# Patient Record
Sex: FEMALE | Race: WHITE | NOT HISPANIC OR LATINO | Employment: PART TIME | URBAN - METROPOLITAN AREA
[De-identification: names, ages, dates, MRNs, and addresses within clinical notes are randomized per-mention and may not be internally consistent; named-entity substitution may affect disease eponyms.]

---

## 2018-07-19 ENCOUNTER — TRANSCRIBE ORDERS (OUTPATIENT)
Dept: ADMINISTRATIVE | Facility: HOSPITAL | Age: 23
End: 2018-07-19

## 2018-07-19 DIAGNOSIS — G62.9 NEUROPATHY: Primary | ICD-10-CM

## 2018-07-24 ENCOUNTER — HOSPITAL ENCOUNTER (OUTPATIENT)
Dept: INFUSION THERAPY | Facility: HOSPITAL | Age: 23
Discharge: HOME | End: 2018-07-24
Attending: INTERNAL MEDICINE | Admitting: PSYCHIATRY & NEUROLOGY

## 2018-07-24 DIAGNOSIS — G62.9 NEUROPATHY: ICD-10-CM

## 2018-07-24 PROCEDURE — 95886 MUSC TEST DONE W/N TEST COMP: CPT | Performed by: PSYCHIATRY & NEUROLOGY

## 2018-07-24 PROCEDURE — 95912 NRV CNDJ TEST 11-12 STUDIES: CPT | Performed by: PSYCHIATRY & NEUROLOGY

## 2018-07-24 PROCEDURE — 95912 NRV CNDJ TEST 11-12 STUDIES: CPT

## 2018-07-24 PROCEDURE — 95886 MUSC TEST DONE W/N TEST COMP: CPT

## 2018-07-24 NOTE — PROCEDURES
EMG REPORT    Indication: Numbness of all 4 extremities with neck pain    Findings: Bilateral median and ulnar sensory study showed normal latencies and amplitudes.  Bilateral median motor study showed normal distal latencies and amplitudes.  Right median velocity was normal.  Bilateral ulnar motor study showed normal distal latencies and amplitudes.  Right peroneal motor study showed normal distal latency velocity and amplitude.  Bilateral tibial motor study showed normal distal latencies and amplitudes.    Concentric needle EMG of the right deltoid, triceps, brachioradialis, extensor digitorum, and first dorsal interosseous muscles showed no abnormality.Concentric needle EMG of the right vastus lateralis, vastus medialis, anterior tibialis, peroneus, gastrocnemius muscles showed no abnormality.    Impression: Normal nerve conduction studies of all 4 extremities.  Normal needle EMG of the right upper and lower extremities.    Thank you for sending this patient for further evaluation.  Grant Delgadillo M.D.

## 2018-08-02 ENCOUNTER — OFFICE VISIT (OUTPATIENT)
Dept: NEUROLOGY | Facility: CLINIC | Age: 23
End: 2018-08-02

## 2018-08-02 VITALS — BODY MASS INDEX: 29.23 KG/M2 | HEIGHT: 63 IN | WEIGHT: 165 LBS

## 2018-08-02 DIAGNOSIS — R68.89 SPELLS OF DECREASED ATTENTIVENESS: Primary | ICD-10-CM

## 2018-08-02 DIAGNOSIS — R20.2 NUMBNESS AND TINGLING OF BOTH FEET: ICD-10-CM

## 2018-08-02 DIAGNOSIS — R20.0 NUMBNESS AND TINGLING OF BOTH FEET: ICD-10-CM

## 2018-08-02 DIAGNOSIS — R20.2 BILATERAL NUMBNESS AND TINGLING OF ARMS AND LEGS: ICD-10-CM

## 2018-08-02 DIAGNOSIS — R20.0 BILATERAL NUMBNESS AND TINGLING OF ARMS AND LEGS: ICD-10-CM

## 2018-08-02 DIAGNOSIS — G44.209 TENSION HEADACHE: ICD-10-CM

## 2018-08-02 PROCEDURE — 99214 OFFICE O/P EST MOD 30 MIN: CPT | Performed by: PSYCHIATRY & NEUROLOGY

## 2018-08-02 RX ORDER — FLUTICASONE PROPIONATE 50 MCG
SPRAY, SUSPENSION (ML) NASAL
COMMUNITY

## 2018-08-02 RX ORDER — DULOXETIN HYDROCHLORIDE 60 MG/1
CAPSULE, DELAYED RELEASE ORAL
COMMUNITY
End: 2019-01-03

## 2018-08-02 RX ORDER — GABAPENTIN 300 MG/1
CAPSULE ORAL
COMMUNITY
End: 2019-01-03

## 2018-08-02 NOTE — PROGRESS NOTES
Subjective:     Patient ID: Enedina Brandt is a 23 y.o. female.    History of Present Illness     Patient is a 23-year-old right-handed young woman who was seen for further evaluation of several neurological issues including headaches, episodes of diminished consciousness, numbness of arms and legs. The patient was seen today in consultation per the request of  Dr. Shankar.  History was also taken from both parents.  Patient has been having problems with progressive numbness and tingling of arms and legs since February.  She was placed on Trokendi in January.  This was stopped a week ago.  She has a history of fibromyalgia and is on gabapentin for this she also takes Cymbalta for anxiety.  She was just taken off of Zoloft.  The patient had EMG and nerve conduction studies of all 4 extremities performed by me that was totally normal she's also had no normal MRI of the brain.  She's had 2 episodes of loss of consciousness.  The first occurred at college she fell and hit her head after her legs were shaky she had another episode of collapse of since that time.  She goes to college in [place].  He also suffers from insomnia and tension headaches.  Headaches are constant and not severe.  She's had extensive blood workup done and those results are not available.  Also having a problem with brain fogginess and trouble concentrating.  These problems seem to have an insidious onset.      The following portions of the patient's history were reviewed and updated as appropriate: allergies, current medications, past family history, past medical history, past social history, past surgical history and problem list.  Family History   Problem Relation Age of Onset   • No Known Problems Mother    • No Known Problems Father      Active Ambulatory Problems     Diagnosis Date Noted   • Spells of decreased attentiveness 08/02/2018   • Tension headache 08/02/2018   • Numbness and tingling of both feet 08/02/2018   • Bilateral numbness  and tingling of arms and legs 08/02/2018     Resolved Ambulatory Problems     Diagnosis Date Noted   • No Resolved Ambulatory Problems     Past Medical History:   Diagnosis Date   • Fibromyalgia, primary    • POTS (postural orthostatic tachycardia syndrome)      Social History     Social History   • Marital status: Single     Spouse name: N/A   • Number of children: N/A   • Years of education: N/A     Occupational History   • Not on file.     Social History Main Topics   • Smoking status: Never Smoker   • Smokeless tobacco: Never Used   • Alcohol use No   • Drug use: No   • Sexual activity: Defer     Other Topics Concern   • Not on file     Social History Narrative   • No narrative on file     No current outpatient prescriptions on file prior to visit.     No current facility-administered medications on file prior to visit.        Review of Systems   Constitutional: Positive for appetite change (LOWER), chills and fatigue. Negative for activity change, diaphoresis, fever and unexpected weight change.   HENT: Positive for sore throat and trouble swallowing (TIGHT). Negative for congestion, dental problem, drooling, ear discharge, ear pain, facial swelling, hearing loss, mouth sores, nosebleeds, postnasal drip, rhinorrhea, sinus pain, sinus pressure, sneezing, tinnitus and voice change.    Eyes: Positive for itching and visual disturbance (BLURRY). Negative for photophobia, pain, discharge and redness.   Respiratory: Positive for shortness of breath (SLEEPING) and wheezing. Negative for apnea, cough, choking, chest tightness and stridor.    Cardiovascular: Positive for chest pain and leg swelling (BOTH KNEES). Negative for palpitations.   Gastrointestinal: Positive for nausea. Negative for abdominal distention, abdominal pain, anal bleeding, blood in stool, constipation, diarrhea, rectal pain and vomiting.   Endocrine: Positive for cold intolerance. Negative for heat intolerance, polydipsia, polyphagia and polyuria.    Genitourinary: Negative for decreased urine volume, difficulty urinating, dyspareunia, dysuria, enuresis, flank pain, frequency, genital sores, hematuria, menstrual problem, pelvic pain, urgency, vaginal bleeding, vaginal discharge and vaginal pain.   Musculoskeletal: Positive for gait problem (FALLS). Negative for arthralgias, back pain, joint swelling, myalgias, neck pain and neck stiffness.   Skin: Negative for color change, pallor, rash and wound.   Allergic/Immunologic: Negative for environmental allergies, food allergies and immunocompromised state.   Neurological: Positive for dizziness, tremors, weakness, light-headedness, numbness (LEGS AND ARMS) and headaches. Negative for seizures, syncope, facial asymmetry and speech difficulty.   Hematological: Negative for adenopathy. Does not bruise/bleed easily.   Psychiatric/Behavioral: Positive for agitation, confusion, decreased concentration, sleep disturbance and suicidal ideas (FEW DAYS AGO, ). Negative for behavioral problems, dysphoric mood, hallucinations and self-injury. The patient is nervous/anxious. The patient is not hyperactive.         Objective:    Neurologic Exam  Mental status was appropriate for age.  Fundoscopy showed no papilledema. Visual fields were full to OKN.  Eye movements were full and conjugate.  Pupillary reflexes were mid-range and symmetric.  No facial weakness was noted.  Tongue was midline.  There was no pattern of focal weakness.  Gait was appropriate for age.  No pathologic reflexes were noted.  No cerebellar signs were noted.  Tone was normal.  Deep tendon reflexes were 2+ and symmetric.  Senses sensory examination was normal to pinprick, vibration, and temperature.  Physical Exam    Assessment/Plan:     Enedina was seen today for brain fog.    Diagnoses and all orders for this visit:    Spells of decreased attentiveness  -     EEG Awake or Asleep Routine; Future  -     Ambulatory Referral to Neuropsychology    Bilateral  numbness and tingling of arms and legs    Numbness and tingling of both feet    Tension headache         Do not have a cause for her symptoms.  Fibromyalgia clouds the diagnosis even more.  MRI of the brain, EMG and nerve conduction studies and neurological examination are all normal.  I have set up a neuropsychological evaluation.  I also set up EEG because of the episodes of loss of consciousness.  It will be problematic to get her testing done as she goes to school in [place].  Have not set up specific follow-up in the office but of asked her to call me after the above testing is available.  If the neuropsychological testing cannot be done here we could try to get it done in [place]. Thank you for allowing me to share in the care of this patient.  Grant Delgadillo M.D.

## 2018-08-08 ENCOUNTER — HOSPITAL ENCOUNTER (OUTPATIENT)
Dept: NEUROLOGY | Facility: HOSPITAL | Age: 23
Discharge: HOME | End: 2018-08-08
Attending: PSYCHIATRY & NEUROLOGY | Admitting: PSYCHIATRY & NEUROLOGY

## 2018-08-08 DIAGNOSIS — R68.89 SPELLS OF DECREASED ATTENTIVENESS: ICD-10-CM

## 2018-08-08 PROCEDURE — 95819 EEG AWAKE AND ASLEEP: CPT

## 2018-08-08 PROCEDURE — 95813 EEG EXTND MNTR 61-119 MIN: CPT | Performed by: PSYCHIATRY & NEUROLOGY

## 2019-01-03 ENCOUNTER — OFFICE VISIT (OUTPATIENT)
Dept: NEUROLOGY | Facility: CLINIC | Age: 24
End: 2019-01-03

## 2019-01-03 VITALS
SYSTOLIC BLOOD PRESSURE: 105 MMHG | OXYGEN SATURATION: 91 % | HEIGHT: 63 IN | HEART RATE: 73 BPM | WEIGHT: 181 LBS | DIASTOLIC BLOOD PRESSURE: 80 MMHG | BODY MASS INDEX: 32.07 KG/M2

## 2019-01-03 DIAGNOSIS — R20.0 NUMBNESS AND TINGLING OF BOTH FEET: ICD-10-CM

## 2019-01-03 DIAGNOSIS — R68.89 SPELLS OF DECREASED ATTENTIVENESS: ICD-10-CM

## 2019-01-03 DIAGNOSIS — G44.209 TENSION HEADACHE: Primary | ICD-10-CM

## 2019-01-03 DIAGNOSIS — R20.0 BILATERAL NUMBNESS AND TINGLING OF ARMS AND LEGS: ICD-10-CM

## 2019-01-03 DIAGNOSIS — R20.2 NUMBNESS AND TINGLING OF BOTH FEET: ICD-10-CM

## 2019-01-03 DIAGNOSIS — R20.2 BILATERAL NUMBNESS AND TINGLING OF ARMS AND LEGS: ICD-10-CM

## 2019-01-03 PROCEDURE — 99213 OFFICE O/P EST LOW 20 MIN: CPT | Performed by: PSYCHIATRY & NEUROLOGY

## 2019-01-03 RX ORDER — TRAZODONE HYDROCHLORIDE 50 MG/1
50 TABLET ORAL NIGHTLY
COMMUNITY

## 2019-01-03 RX ORDER — CYCLOBENZAPRINE HCL 10 MG
10 TABLET ORAL 3 TIMES DAILY PRN
COMMUNITY

## 2019-01-03 RX ORDER — VENLAFAXINE HYDROCHLORIDE 150 MG/1
150 CAPSULE, EXTENDED RELEASE ORAL DAILY
COMMUNITY

## 2019-01-03 RX ORDER — PREGABALIN 75 MG/1
75 CAPSULE ORAL 2 TIMES DAILY
COMMUNITY

## 2019-01-03 RX ORDER — RIZATRIPTAN BENZOATE 5 MG/1
5 TABLET ORAL ONCE AS NEEDED
Qty: 9 TABLET | Refills: 11 | Status: SHIPPED | OUTPATIENT
Start: 2019-01-03 | End: 2020-01-02 | Stop reason: SDUPTHER

## 2019-01-03 NOTE — PROGRESS NOTES
Subjective:     Patient ID: Enedina Brandt is a 23 y.o. female.    History of Present Illness  Many of the patient's somatic symptoms have improved.  Since her last visit in August EEG was normal she's had neuropsych evaluation which showed depression and anxiety and a tendency toward somatic symptoms.  Now on Lyrica.  Since being off Trokendi she has less numbness.  She is having migraines 1 or 2 about every 2 weeks.  3 was also taken from the patient's mother.    The following portions of the patient's history were reviewed and updated as appropriate: allergies, current medications, past family history, past medical history, past social history, past surgical history and problem list.      Current Outpatient Medications:   •  cyclobenzaprine (FLEXERIL) 10 MG tablet, Take 10 mg by mouth 3 (Three) Times a Day As Needed for Muscle Spasms., Disp: , Rfl:   •  fluticasone (FLONASE) 50 MCG/ACT nasal spray, fluticasone 50 mcg/actuation nasal spray,suspension PRN, Disp: , Rfl:   •  pregabalin (LYRICA) 75 MG capsule, Take 75 mg by mouth 2 (Two) Times a Day., Disp: , Rfl:   •  traZODone (DESYREL) 50 MG tablet, Take 50 mg by mouth Every Night., Disp: , Rfl:   •  venlafaxine XR (EFFEXOR-XR) 150 MG 24 hr capsule, Take 150 mg by mouth Daily., Disp: , Rfl:   •  rizatriptan (MAXALT) 5 MG tablet, Take 1 tablet by mouth 1 (One) Time As Needed for Migraine. May repeat in 2 hours if needed, Disp: 9 tablet, Rfl: 11    Review of Systems   Constitutional: Positive for appetite change. Negative for activity change, chills, diaphoresis, fatigue, fever and unexpected weight change.   Neurological: Positive for dizziness, tremors, weakness, light-headedness, numbness and headaches. Negative for seizures, syncope, facial asymmetry and speech difficulty.   Psychiatric/Behavioral: Positive for decreased concentration and sleep disturbance. Negative for agitation, behavioral problems, confusion, dysphoric mood, hallucinations, self-injury and  suicidal ideas. The patient is nervous/anxious. The patient is not hyperactive.         Objective:    Neurologic Exam  Mental status examination was appropriate.  Funduscopy, visual fields, eye movements and pupillary reflexes were normal.  No facial weakness was noted.  Gait was normal.  No pattern of focal weakness was noted.  Physical Exam    Assessment/Plan:     Enedina was seen today for neurologic problem and numbness.    Diagnoses and all orders for this visit:    Tension headache    Numbness and tingling of both feet    Bilateral numbness and tingling of arms and legs    Spells of decreased attentiveness    Other orders  -     rizatriptan (MAXALT) 5 MG tablet; Take 1 tablet by mouth 1 (One) Time As Needed for Migraine. May repeat in 2 hours if needed       She still needs something for migraine.  Started her on magnesium oxide 400 mg a day.  Also Maxalt 5 mg when necessary.  Follow-up the office in one year. Thank you for allowing me to share in the care of this patient.  Grant Delgadillo M.D.

## 2020-01-02 ENCOUNTER — OFFICE VISIT (OUTPATIENT)
Dept: NEUROLOGY | Facility: CLINIC | Age: 25
End: 2020-01-02

## 2020-01-02 VITALS
OXYGEN SATURATION: 97 % | DIASTOLIC BLOOD PRESSURE: 74 MMHG | SYSTOLIC BLOOD PRESSURE: 128 MMHG | HEIGHT: 63 IN | WEIGHT: 197 LBS | HEART RATE: 82 BPM | BODY MASS INDEX: 34.91 KG/M2

## 2020-01-02 DIAGNOSIS — R20.0 NUMBNESS AND TINGLING OF BOTH FEET: Primary | ICD-10-CM

## 2020-01-02 DIAGNOSIS — R20.2 NUMBNESS AND TINGLING OF BOTH FEET: Primary | ICD-10-CM

## 2020-01-02 DIAGNOSIS — G44.209 TENSION HEADACHE: ICD-10-CM

## 2020-01-02 PROCEDURE — 99213 OFFICE O/P EST LOW 20 MIN: CPT | Performed by: PSYCHIATRY & NEUROLOGY

## 2020-01-02 RX ORDER — ALBUTEROL SULFATE 90 UG/1
AEROSOL, METERED RESPIRATORY (INHALATION)
COMMUNITY

## 2020-01-02 RX ORDER — RIZATRIPTAN BENZOATE 10 MG/1
10 TABLET ORAL ONCE AS NEEDED
Qty: 9 TABLET | Refills: 11 | Status: SHIPPED | OUTPATIENT
Start: 2020-01-02 | End: 2021-01-01

## 2020-01-02 NOTE — PROGRESS NOTES
Subjective:     Patient ID: Enedina Brandt is a 24 y.o. female.    History of Present Illness    The patient was seen back in the office for follow-up of headache and numbness and tingling.  Numbness and tingling has resolved since she has been off Trokendi.  Her headaches have increased but she has been under more stress recently.  Magnesium oxide 400 mg seems to help.  When she misses the dose her headaches are worse.  She just has had her fluoxetine increased.  She is on Maxalt 5 mg that works some of the time.  The following portions of the patient's history were reviewed and updated as appropriate: allergies, current medications, past family history, past medical history, past social history, past surgical history and problem list.      Current Outpatient Medications:   •  albuterol sulfate HFA (PROAIR HFA) 108 (90 Base) MCG/ACT inhaler, ProAir HFA 90 mcg/actuation aerosol inhaler, Disp: , Rfl:   •  cyclobenzaprine (FLEXERIL) 10 MG tablet, Take 10 mg by mouth 3 (Three) Times a Day As Needed for Muscle Spasms., Disp: , Rfl:   •  fluticasone (FLONASE) 50 MCG/ACT nasal spray, fluticasone 50 mcg/actuation nasal spray,suspension PRN, Disp: , Rfl:   •  pregabalin (LYRICA) 75 MG capsule, Take 75 mg by mouth 2 (Two) Times a Day., Disp: , Rfl:   •  rizatriptan (MAXALT) 10 MG tablet, Take 1 tablet by mouth 1 (One) Time As Needed for Migraine. May repeat in 2 hours if needed, Disp: 9 tablet, Rfl: 11  •  traZODone (DESYREL) 50 MG tablet, Take 50 mg by mouth Every Night., Disp: , Rfl:   •  venlafaxine XR (EFFEXOR-XR) 150 MG 24 hr capsule, Take 150 mg by mouth Daily., Disp: , Rfl:     Review of Systems       I have reviewed ROS completed by medical assistant.     Objective:    Neurologic Exam  Mental status examination was appropriate.  Funduscopy, visual fields, eye movements and pupillary reflexes were normal.  No facial weakness was noted.  Gait was normal.  No pattern of focal weakness was noted.  Physical  Exam    Assessment/Plan:     Enedina was seen today for headache.    Diagnoses and all orders for this visit:    Numbness and tingling of both feet    Tension headache    Other orders  -     rizatriptan (MAXALT) 10 MG tablet; Take 1 tablet by mouth 1 (One) Time As Needed for Migraine. May repeat in 2 hours if needed       Increased rizatriptan to 10 mg.  Continue magnesium oxide 400 mg.  Fluoxetine just increased would not change her to a new preventive medication.  Prescription drug management - meds as above    Follow-up in the office in one year. Thank you for allowing me to share in the care of this patient.  Grant Delgadillo M.D.